# Patient Record
Sex: MALE | Race: BLACK OR AFRICAN AMERICAN | NOT HISPANIC OR LATINO | Employment: UNEMPLOYED | URBAN - METROPOLITAN AREA
[De-identification: names, ages, dates, MRNs, and addresses within clinical notes are randomized per-mention and may not be internally consistent; named-entity substitution may affect disease eponyms.]

---

## 2019-08-05 ENCOUNTER — APPOINTMENT (OUTPATIENT)
Dept: GENERAL RADIOLOGY | Facility: HOSPITAL | Age: 34
End: 2019-08-05

## 2019-08-05 ENCOUNTER — APPOINTMENT (OUTPATIENT)
Dept: CT IMAGING | Facility: HOSPITAL | Age: 34
End: 2019-08-05

## 2019-08-05 ENCOUNTER — APPOINTMENT (OUTPATIENT)
Dept: CARDIOLOGY | Facility: HOSPITAL | Age: 34
End: 2019-08-05

## 2019-08-05 ENCOUNTER — HOSPITAL ENCOUNTER (OUTPATIENT)
Facility: HOSPITAL | Age: 34
Setting detail: OBSERVATION
Discharge: HOME | End: 2019-08-06
Attending: EMERGENCY MEDICINE | Admitting: INTERNAL MEDICINE

## 2019-08-05 DIAGNOSIS — I26.90 ACUTE SEPTIC PULMONARY EMBOLISM WITHOUT ACUTE COR PULMONALE (HCC): Primary | ICD-10-CM

## 2019-08-05 PROBLEM — F19.10 POLYSUBSTANCE ABUSE (HCC): Status: ACTIVE | Noted: 2019-08-05

## 2019-08-05 PROBLEM — E87.20 METABOLIC ACIDOSIS: Status: ACTIVE | Noted: 2019-08-05

## 2019-08-05 PROBLEM — Z72.0 TOBACCO ABUSE: Status: ACTIVE | Noted: 2019-08-05

## 2019-08-05 PROBLEM — D50.9 MICROCYTIC ANEMIA: Status: ACTIVE | Noted: 2019-08-05

## 2019-08-05 PROBLEM — T50.901A DRUG OVERDOSE: Status: ACTIVE | Noted: 2019-08-05

## 2019-08-05 PROBLEM — J18.9 PNEUMONIA: Status: ACTIVE | Noted: 2019-08-05

## 2019-08-05 PROBLEM — R93.89 ABNORMAL CT OF THE CHEST: Status: ACTIVE | Noted: 2019-08-05

## 2019-08-05 LAB
ALBUMIN SERPL-MCNC: 4.6 G/DL (ref 3.5–5.2)
ALBUMIN/GLOB SERPL: 2 G/DL
ALP SERPL-CCNC: 64 U/L (ref 39–117)
ALT SERPL W P-5'-P-CCNC: 24 U/L (ref 1–41)
AMPHET+METHAMPHET UR QL: NEGATIVE
ANION GAP SERPL CALCULATED.3IONS-SCNC: 11.1 MMOL/L (ref 5–15)
ANION GAP SERPL CALCULATED.3IONS-SCNC: 19.3 MMOL/L (ref 5–15)
AST SERPL-CCNC: 32 U/L (ref 1–40)
BARBITURATES UR QL SCN: NEGATIVE
BASOPHILS # BLD AUTO: 0.05 10*3/MM3 (ref 0–0.2)
BASOPHILS NFR BLD AUTO: 0.2 % (ref 0–1.5)
BENZODIAZ UR QL SCN: NEGATIVE
BH CV ECHO MEAS - ACS: 2.7 CM
BH CV ECHO MEAS - AO MAX PG (FULL): 2.1 MMHG
BH CV ECHO MEAS - AO MAX PG: 4.9 MMHG
BH CV ECHO MEAS - AO MEAN PG (FULL): 1 MMHG
BH CV ECHO MEAS - AO MEAN PG: 3 MMHG
BH CV ECHO MEAS - AO ROOT AREA (BSA CORRECTED): 1.9
BH CV ECHO MEAS - AO ROOT AREA: 12.6 CM^2
BH CV ECHO MEAS - AO ROOT DIAM: 4 CM
BH CV ECHO MEAS - AO V2 MAX: 111 CM/SEC
BH CV ECHO MEAS - AO V2 MEAN: 80.4 CM/SEC
BH CV ECHO MEAS - AO V2 VTI: 27.4 CM
BH CV ECHO MEAS - AVA(I,A): 2.4 CM^2
BH CV ECHO MEAS - AVA(I,D): 2.4 CM^2
BH CV ECHO MEAS - AVA(V,A): 2.6 CM^2
BH CV ECHO MEAS - AVA(V,D): 2.6 CM^2
BH CV ECHO MEAS - BSA(HAYCOCK): 2 M^2
BH CV ECHO MEAS - BSA: 2.1 M^2
BH CV ECHO MEAS - BZI_BMI: 19.9 KILOGRAMS/M^2
BH CV ECHO MEAS - BZI_METRIC_HEIGHT: 195.6 CM
BH CV ECHO MEAS - BZI_METRIC_WEIGHT: 76.2 KG
BH CV ECHO MEAS - EDV(CUBED): 157.5 ML
BH CV ECHO MEAS - EDV(MOD-SP2): 112 ML
BH CV ECHO MEAS - EDV(MOD-SP4): 98 ML
BH CV ECHO MEAS - EDV(TEICH): 141.3 ML
BH CV ECHO MEAS - EF(CUBED): 59.4 %
BH CV ECHO MEAS - EF(MOD-BP): 54 %
BH CV ECHO MEAS - EF(MOD-SP2): 50.9 %
BH CV ECHO MEAS - EF(MOD-SP4): 54.1 %
BH CV ECHO MEAS - EF(TEICH): 50.5 %
BH CV ECHO MEAS - ESV(CUBED): 64 ML
BH CV ECHO MEAS - ESV(MOD-SP2): 55 ML
BH CV ECHO MEAS - ESV(MOD-SP4): 45 ML
BH CV ECHO MEAS - ESV(TEICH): 70 ML
BH CV ECHO MEAS - FS: 25.9 %
BH CV ECHO MEAS - IVS/LVPW: 1.1
BH CV ECHO MEAS - IVSD: 1.2 CM
BH CV ECHO MEAS - LAT PEAK E' VEL: 18.1 CM/SEC
BH CV ECHO MEAS - LV DIASTOLIC VOL/BSA (35-75): 47.2 ML/M^2
BH CV ECHO MEAS - LV MASS(C)D: 249.4 GRAMS
BH CV ECHO MEAS - LV MASS(C)DI: 120.1 GRAMS/M^2
BH CV ECHO MEAS - LV MAX PG: 2.8 MMHG
BH CV ECHO MEAS - LV MEAN PG: 2 MMHG
BH CV ECHO MEAS - LV SYSTOLIC VOL/BSA (12-30): 21.7 ML/M^2
BH CV ECHO MEAS - LV V1 MAX: 84.2 CM/SEC
BH CV ECHO MEAS - LV V1 MEAN: 62.9 CM/SEC
BH CV ECHO MEAS - LV V1 VTI: 19.1 CM
BH CV ECHO MEAS - LVIDD: 5.4 CM
BH CV ECHO MEAS - LVIDS: 4 CM
BH CV ECHO MEAS - LVLD AP2: 8.7 CM
BH CV ECHO MEAS - LVLD AP4: 7.1 CM
BH CV ECHO MEAS - LVLS AP2: 7.4 CM
BH CV ECHO MEAS - LVLS AP4: 6.4 CM
BH CV ECHO MEAS - LVOT AREA (M): 3.5 CM^2
BH CV ECHO MEAS - LVOT AREA: 3.5 CM^2
BH CV ECHO MEAS - LVOT DIAM: 2.1 CM
BH CV ECHO MEAS - LVPWD: 1.1 CM
BH CV ECHO MEAS - MED PEAK E' VEL: 10.9 CM/SEC
BH CV ECHO MEAS - MV A DUR: 0.15 SEC
BH CV ECHO MEAS - MV A MAX VEL: 44.1 CM/SEC
BH CV ECHO MEAS - MV DEC SLOPE: 277 CM/SEC^2
BH CV ECHO MEAS - MV DEC TIME: 0.24 SEC
BH CV ECHO MEAS - MV E MAX VEL: 56.3 CM/SEC
BH CV ECHO MEAS - MV E/A: 1.3
BH CV ECHO MEAS - MV MAX PG: 2.1 MMHG
BH CV ECHO MEAS - MV MEAN PG: 1 MMHG
BH CV ECHO MEAS - MV P1/2T MAX VEL: 71.8 CM/SEC
BH CV ECHO MEAS - MV P1/2T: 75.9 MSEC
BH CV ECHO MEAS - MV V2 MAX: 73.3 CM/SEC
BH CV ECHO MEAS - MV V2 MEAN: 37.9 CM/SEC
BH CV ECHO MEAS - MV V2 VTI: 25.5 CM
BH CV ECHO MEAS - MVA P1/2T LCG: 3.1 CM^2
BH CV ECHO MEAS - MVA(P1/2T): 2.9 CM^2
BH CV ECHO MEAS - MVA(VTI): 2.6 CM^2
BH CV ECHO MEAS - PA ACC TIME: 0.09 SEC
BH CV ECHO MEAS - PA MAX PG (FULL): 1.6 MMHG
BH CV ECHO MEAS - PA MAX PG: 2.6 MMHG
BH CV ECHO MEAS - PA PR(ACCEL): 37.6 MMHG
BH CV ECHO MEAS - PA V2 MAX: 80.1 CM/SEC
BH CV ECHO MEAS - PULM A REVS DUR: 0.14 SEC
BH CV ECHO MEAS - PULM A REVS VEL: 23.3 CM/SEC
BH CV ECHO MEAS - PULM DIAS VEL: 33.5 CM/SEC
BH CV ECHO MEAS - PULM S/D: 0.88
BH CV ECHO MEAS - PULM SYS VEL: 29.6 CM/SEC
BH CV ECHO MEAS - PVA(V,A): 1.7 CM^2
BH CV ECHO MEAS - PVA(V,D): 1.7 CM^2
BH CV ECHO MEAS - QP/QS: 0.36
BH CV ECHO MEAS - RV MAX PG: 0.92 MMHG
BH CV ECHO MEAS - RV MEAN PG: 0 MMHG
BH CV ECHO MEAS - RV V1 MAX: 48 CM/SEC
BH CV ECHO MEAS - RV V1 MEAN: 33 CM/SEC
BH CV ECHO MEAS - RV V1 VTI: 8.5 CM
BH CV ECHO MEAS - RVOT AREA: 2.8 CM^2
BH CV ECHO MEAS - RVOT DIAM: 1.9 CM
BH CV ECHO MEAS - SI(AO): 165.8 ML/M^2
BH CV ECHO MEAS - SI(CUBED): 45 ML/M^2
BH CV ECHO MEAS - SI(LVOT): 31.9 ML/M^2
BH CV ECHO MEAS - SI(MOD-SP2): 27.4 ML/M^2
BH CV ECHO MEAS - SI(MOD-SP4): 25.5 ML/M^2
BH CV ECHO MEAS - SI(TEICH): 34.3 ML/M^2
BH CV ECHO MEAS - SV(AO): 344.3 ML
BH CV ECHO MEAS - SV(CUBED): 93.5 ML
BH CV ECHO MEAS - SV(LVOT): 66.2 ML
BH CV ECHO MEAS - SV(MOD-SP2): 57 ML
BH CV ECHO MEAS - SV(MOD-SP4): 53 ML
BH CV ECHO MEAS - SV(RVOT): 24 ML
BH CV ECHO MEAS - SV(TEICH): 71.3 ML
BH CV ECHO MEAS - TAPSE (>1.6): 2 CM2
BH CV ECHO MEASUREMENTS AVERAGE E/E' RATIO: 3.88
BH CV XLRA - RV BASE: 3.8 CM
BH CV XLRA - RV LENGTH: 8.1 CM
BH CV XLRA - RV MID: 3.3 CM
BH CV XLRA - TDI S': 10.3 CM/SEC
BILIRUB SERPL-MCNC: 0.2 MG/DL (ref 0.2–1.2)
BILIRUB UR QL STRIP: NEGATIVE
BUN BLD-MCNC: 12 MG/DL (ref 6–20)
BUN BLD-MCNC: 14 MG/DL (ref 6–20)
BUN/CREAT SERPL: 12.5 (ref 7–25)
BUN/CREAT SERPL: 13.8 (ref 7–25)
CALCIUM SPEC-SCNC: 8.3 MG/DL (ref 8.6–10.5)
CALCIUM SPEC-SCNC: 9 MG/DL (ref 8.6–10.5)
CANNABINOIDS SERPL QL: POSITIVE
CHLORIDE SERPL-SCNC: 103 MMOL/L (ref 98–107)
CHLORIDE SERPL-SCNC: 107 MMOL/L (ref 98–107)
CLARITY UR: CLEAR
CO2 SERPL-SCNC: 17.7 MMOL/L (ref 22–29)
CO2 SERPL-SCNC: 22.9 MMOL/L (ref 22–29)
COCAINE UR QL: POSITIVE
COLOR UR: YELLOW
CREAT BLD-MCNC: 0.87 MG/DL (ref 0.76–1.27)
CREAT BLD-MCNC: 1.12 MG/DL (ref 0.76–1.27)
D-LACTATE SERPL-SCNC: 2 MMOL/L (ref 0.5–2)
DEPRECATED RDW RBC AUTO: 42.1 FL (ref 37–54)
DEPRECATED RDW RBC AUTO: 44 FL (ref 37–54)
EOSINOPHIL # BLD AUTO: 0.21 10*3/MM3 (ref 0–0.4)
EOSINOPHIL NFR BLD AUTO: 0.9 % (ref 0.3–6.2)
ERYTHROCYTE [DISTWIDTH] IN BLOOD BY AUTOMATED COUNT: 15.4 % (ref 12.3–15.4)
ERYTHROCYTE [DISTWIDTH] IN BLOOD BY AUTOMATED COUNT: 15.8 % (ref 12.3–15.4)
ETHANOL BLD-MCNC: <10 MG/DL (ref 0–10)
ETHANOL UR QL: <0.01 %
GFR SERPL CREATININE-BSD FRML MDRD: 121 ML/MIN/1.73
GFR SERPL CREATININE-BSD FRML MDRD: 90 ML/MIN/1.73
GLOBULIN UR ELPH-MCNC: 2.3 GM/DL
GLUCOSE BLD-MCNC: 130 MG/DL (ref 65–99)
GLUCOSE BLD-MCNC: 86 MG/DL (ref 65–99)
GLUCOSE UR STRIP-MCNC: NEGATIVE MG/DL
HCT VFR BLD AUTO: 34.6 % (ref 37.5–51)
HCT VFR BLD AUTO: 41.5 % (ref 37.5–51)
HGB BLD-MCNC: 10.8 G/DL (ref 13–17.7)
HGB BLD-MCNC: 12.5 G/DL (ref 13–17.7)
HGB UR QL STRIP.AUTO: NEGATIVE
IMM GRANULOCYTES # BLD AUTO: 0.14 10*3/MM3 (ref 0–0.05)
IMM GRANULOCYTES NFR BLD AUTO: 0.6 % (ref 0–0.5)
IRON 24H UR-MRATE: 65 MCG/DL (ref 59–158)
IRON SATN MFR SERPL: 26 % (ref 20–50)
KETONES UR QL STRIP: ABNORMAL
LEFT ATRIUM VOLUME INDEX: 24 ML/M2
LEUKOCYTE ESTERASE UR QL STRIP.AUTO: NEGATIVE
LYMPHOCYTES # BLD AUTO: 2.16 10*3/MM3 (ref 0.7–3.1)
LYMPHOCYTES NFR BLD AUTO: 9.1 % (ref 19.6–45.3)
MAXIMAL PREDICTED HEART RATE: 185 BPM
MCH RBC QN AUTO: 23.4 PG (ref 26.6–33)
MCH RBC QN AUTO: 23.5 PG (ref 26.6–33)
MCHC RBC AUTO-ENTMCNC: 30.1 G/DL (ref 31.5–35.7)
MCHC RBC AUTO-ENTMCNC: 31.2 G/DL (ref 31.5–35.7)
MCV RBC AUTO: 75.4 FL (ref 79–97)
MCV RBC AUTO: 77.7 FL (ref 79–97)
METHADONE UR QL SCN: NEGATIVE
MONOCYTES # BLD AUTO: 1.42 10*3/MM3 (ref 0.1–0.9)
MONOCYTES NFR BLD AUTO: 6 % (ref 5–12)
NEUTROPHILS # BLD AUTO: 19.81 10*3/MM3 (ref 1.7–7)
NEUTROPHILS NFR BLD AUTO: 83.2 % (ref 42.7–76)
NITRITE UR QL STRIP: NEGATIVE
NRBC BLD AUTO-RTO: 0 /100 WBC (ref 0–0.2)
OPIATES UR QL: NEGATIVE
OXYCODONE UR QL SCN: NEGATIVE
PH UR STRIP.AUTO: <=5 [PH] (ref 5–8)
PLATELET # BLD AUTO: 164 10*3/MM3 (ref 140–450)
PLATELET # BLD AUTO: 181 10*3/MM3 (ref 140–450)
PMV BLD AUTO: 12 FL (ref 6–12)
PMV BLD AUTO: 9.8 FL (ref 6–12)
POTASSIUM BLD-SCNC: 4.4 MMOL/L (ref 3.5–5.2)
POTASSIUM BLD-SCNC: 4.5 MMOL/L (ref 3.5–5.2)
PROCALCITONIN SERPL-MCNC: 1.39 NG/ML (ref 0.1–0.25)
PROT SERPL-MCNC: 6.9 G/DL (ref 6–8.5)
PROT UR QL STRIP: ABNORMAL
RBC # BLD AUTO: 4.59 10*6/MM3 (ref 4.14–5.8)
RBC # BLD AUTO: 5.34 10*6/MM3 (ref 4.14–5.8)
SODIUM BLD-SCNC: 137 MMOL/L (ref 136–145)
SODIUM BLD-SCNC: 144 MMOL/L (ref 136–145)
SP GR UR STRIP: >=1.03 (ref 1–1.03)
STRESS TARGET HR: 157 BPM
TIBC SERPL-MCNC: 246 MCG/DL (ref 298–536)
TRANSFERRIN SERPL-MCNC: 165 MG/DL (ref 200–360)
UROBILINOGEN UR QL STRIP: ABNORMAL
WBC NRBC COR # BLD: 12.09 10*3/MM3 (ref 3.4–10.8)
WBC NRBC COR # BLD: 23.79 10*3/MM3 (ref 3.4–10.8)

## 2019-08-05 PROCEDURE — 80307 DRUG TEST PRSMV CHEM ANLYZR: CPT | Performed by: EMERGENCY MEDICINE

## 2019-08-05 PROCEDURE — 84145 PROCALCITONIN (PCT): CPT | Performed by: NURSE PRACTITIONER

## 2019-08-05 PROCEDURE — 85025 COMPLETE CBC W/AUTO DIFF WBC: CPT | Performed by: EMERGENCY MEDICINE

## 2019-08-05 PROCEDURE — 83540 ASSAY OF IRON: CPT | Performed by: NURSE PRACTITIONER

## 2019-08-05 PROCEDURE — 80053 COMPREHEN METABOLIC PANEL: CPT | Performed by: EMERGENCY MEDICINE

## 2019-08-05 PROCEDURE — 85027 COMPLETE CBC AUTOMATED: CPT | Performed by: NURSE PRACTITIONER

## 2019-08-05 PROCEDURE — 25010000003 CEFTRIAXONE PER 250 MG: Performed by: EMERGENCY MEDICINE

## 2019-08-05 PROCEDURE — G0378 HOSPITAL OBSERVATION PER HR: HCPCS

## 2019-08-05 PROCEDURE — 99285 EMERGENCY DEPT VISIT HI MDM: CPT

## 2019-08-05 PROCEDURE — 36415 COLL VENOUS BLD VENIPUNCTURE: CPT | Performed by: NURSE PRACTITIONER

## 2019-08-05 PROCEDURE — 71275 CT ANGIOGRAPHY CHEST: CPT

## 2019-08-05 PROCEDURE — 81003 URINALYSIS AUTO W/O SCOPE: CPT | Performed by: EMERGENCY MEDICINE

## 2019-08-05 PROCEDURE — 96361 HYDRATE IV INFUSION ADD-ON: CPT

## 2019-08-05 PROCEDURE — 80048 BASIC METABOLIC PNL TOTAL CA: CPT | Performed by: NURSE PRACTITIONER

## 2019-08-05 PROCEDURE — 0 IOPAMIDOL PER 1 ML: Performed by: EMERGENCY MEDICINE

## 2019-08-05 PROCEDURE — 84466 ASSAY OF TRANSFERRIN: CPT | Performed by: NURSE PRACTITIONER

## 2019-08-05 PROCEDURE — 71046 X-RAY EXAM CHEST 2 VIEWS: CPT

## 2019-08-05 PROCEDURE — 93306 TTE W/DOPPLER COMPLETE: CPT | Performed by: INTERNAL MEDICINE

## 2019-08-05 PROCEDURE — 87040 BLOOD CULTURE FOR BACTERIA: CPT | Performed by: EMERGENCY MEDICINE

## 2019-08-05 PROCEDURE — 25010000002 VANCOMYCIN PER 500 MG: Performed by: NURSE PRACTITIONER

## 2019-08-05 PROCEDURE — 96365 THER/PROPH/DIAG IV INF INIT: CPT

## 2019-08-05 PROCEDURE — 83605 ASSAY OF LACTIC ACID: CPT | Performed by: EMERGENCY MEDICINE

## 2019-08-05 PROCEDURE — 99254 IP/OBS CNSLTJ NEW/EST MOD 60: CPT | Performed by: INTERNAL MEDICINE

## 2019-08-05 PROCEDURE — 25010000002 VANCOMYCIN 10 G RECONSTITUTED SOLUTION: Performed by: EMERGENCY MEDICINE

## 2019-08-05 PROCEDURE — 93306 TTE W/DOPPLER COMPLETE: CPT

## 2019-08-05 RX ORDER — ONDANSETRON 2 MG/ML
4 INJECTION INTRAMUSCULAR; INTRAVENOUS EVERY 6 HOURS PRN
Status: DISCONTINUED | OUTPATIENT
Start: 2019-08-05 | End: 2019-08-06 | Stop reason: HOSPADM

## 2019-08-05 RX ORDER — SODIUM CHLORIDE 9 MG/ML
1000 INJECTION, SOLUTION INTRAVENOUS CONTINUOUS
Status: DISCONTINUED | OUTPATIENT
Start: 2019-08-05 | End: 2019-08-05

## 2019-08-05 RX ORDER — CALCIUM CARBONATE 200(500)MG
2 TABLET,CHEWABLE ORAL 2 TIMES DAILY PRN
Status: DISCONTINUED | OUTPATIENT
Start: 2019-08-05 | End: 2019-08-06 | Stop reason: HOSPADM

## 2019-08-05 RX ORDER — ONDANSETRON 4 MG/1
4 TABLET, FILM COATED ORAL EVERY 6 HOURS PRN
Status: DISCONTINUED | OUTPATIENT
Start: 2019-08-05 | End: 2019-08-06 | Stop reason: HOSPADM

## 2019-08-05 RX ORDER — SODIUM CHLORIDE 0.9 % (FLUSH) 0.9 %
3-10 SYRINGE (ML) INJECTION AS NEEDED
Status: DISCONTINUED | OUTPATIENT
Start: 2019-08-05 | End: 2019-08-06 | Stop reason: HOSPADM

## 2019-08-05 RX ORDER — SODIUM CHLORIDE 0.9 % (FLUSH) 0.9 %
10 SYRINGE (ML) INJECTION AS NEEDED
Status: DISCONTINUED | OUTPATIENT
Start: 2019-08-05 | End: 2019-08-06 | Stop reason: HOSPADM

## 2019-08-05 RX ORDER — SODIUM CHLORIDE 0.9 % (FLUSH) 0.9 %
3 SYRINGE (ML) INJECTION EVERY 12 HOURS SCHEDULED
Status: DISCONTINUED | OUTPATIENT
Start: 2019-08-05 | End: 2019-08-06 | Stop reason: HOSPADM

## 2019-08-05 RX ORDER — CEFTRIAXONE SODIUM 2 G/50ML
2 INJECTION, SOLUTION INTRAVENOUS ONCE
Status: COMPLETED | OUTPATIENT
Start: 2019-08-05 | End: 2019-08-05

## 2019-08-05 RX ORDER — CEFTRIAXONE SODIUM 2 G/50ML
2 INJECTION, SOLUTION INTRAVENOUS EVERY 24 HOURS
Status: DISCONTINUED | OUTPATIENT
Start: 2019-08-06 | End: 2019-08-06 | Stop reason: HOSPADM

## 2019-08-05 RX ORDER — NITROGLYCERIN 0.4 MG/1
0.4 TABLET SUBLINGUAL
Status: DISCONTINUED | OUTPATIENT
Start: 2019-08-05 | End: 2019-08-06 | Stop reason: HOSPADM

## 2019-08-05 RX ORDER — BISACODYL 5 MG/1
5 TABLET, DELAYED RELEASE ORAL DAILY PRN
Status: DISCONTINUED | OUTPATIENT
Start: 2019-08-05 | End: 2019-08-06 | Stop reason: HOSPADM

## 2019-08-05 RX ORDER — ACETAMINOPHEN 325 MG/1
650 TABLET ORAL EVERY 4 HOURS PRN
Status: DISCONTINUED | OUTPATIENT
Start: 2019-08-05 | End: 2019-08-06 | Stop reason: HOSPADM

## 2019-08-05 RX ORDER — VANCOMYCIN HYDROCHLORIDE 1 G/200ML
1000 INJECTION, SOLUTION INTRAVENOUS EVERY 8 HOURS
Status: DISCONTINUED | OUTPATIENT
Start: 2019-08-05 | End: 2019-08-06 | Stop reason: HOSPADM

## 2019-08-05 RX ORDER — SODIUM CHLORIDE 9 MG/ML
100 INJECTION, SOLUTION INTRAVENOUS CONTINUOUS
Status: DISCONTINUED | OUTPATIENT
Start: 2019-08-05 | End: 2019-08-06 | Stop reason: HOSPADM

## 2019-08-05 RX ADMIN — VANCOMYCIN HYDROCHLORIDE 1000 MG: 1 INJECTION, SOLUTION INTRAVENOUS at 12:47

## 2019-08-05 RX ADMIN — SODIUM CHLORIDE 100 ML/HR: 9 INJECTION, SOLUTION INTRAVENOUS at 08:42

## 2019-08-05 RX ADMIN — VANCOMYCIN HYDROCHLORIDE 1750 MG: 10 INJECTION, POWDER, LYOPHILIZED, FOR SOLUTION INTRAVENOUS at 05:18

## 2019-08-05 RX ADMIN — SODIUM CHLORIDE 100 ML/HR: 9 INJECTION, SOLUTION INTRAVENOUS at 18:06

## 2019-08-05 RX ADMIN — SODIUM CHLORIDE, PRESERVATIVE FREE 3 ML: 5 INJECTION INTRAVENOUS at 20:38

## 2019-08-05 RX ADMIN — CEFTRIAXONE SODIUM 2 G: 2 INJECTION, SOLUTION INTRAVENOUS at 04:26

## 2019-08-05 RX ADMIN — VANCOMYCIN HYDROCHLORIDE 1000 MG: 1 INJECTION, SOLUTION INTRAVENOUS at 20:38

## 2019-08-05 RX ADMIN — SODIUM CHLORIDE 125 ML/HR: 9 INJECTION, SOLUTION INTRAVENOUS at 02:57

## 2019-08-05 RX ADMIN — IOPAMIDOL 95 ML: 755 INJECTION, SOLUTION INTRAVENOUS at 03:13

## 2019-08-05 NOTE — ED TRIAGE NOTES
Pt was found down at his friends house. Pt reports smoking marijuana. EMS reports pt had an O2 sat of 54 and had snoring respirations. Pt was given MG of Narcan. Pt is now alert and oriented. Pts Temp is 94.6

## 2019-08-05 NOTE — PLAN OF CARE
Problem: Patient Care Overview  Goal: Plan of Care Review  Outcome: Ongoing (interventions implemented as appropriate)  Pt states he does not remember what happened, just drinking water before going to bed. Wife at bedside. He denies pain, discomfort, or shortness of breath at this time

## 2019-08-05 NOTE — CONSULTS
Referring Provider: Cameron Nayak MD  [address] [facility] [address] [address] [address]  [place] [address]  Reason for Consultation: Concern for pulmonary septic emboli    Subjective   History of present illness: This is a 34-year-old male with history of polysubstance abuse who was admitted August 5 after being found down at his friend's house.  Upon EMS arrival he was satting 54% on room air.  He was given Narcan.  Patient denies any fever chills or night sweats.  Admission blood work revealed a white blood cell count 23,000 and elevated procalcitonin above 1.  Imaging showed upper lobe nodular lesions concerning for septic emboli.  The patient has been afebrile.  Blood cultures are negative today.  He has been empirically started on vancomycin and cefepime.   Patient states he has been feeling totally fine.  He denies any fevers chills or night sweats.  No shortness of breath cough or chest pain.  He states he is weight has been stable.  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  He adamantly denies IV drug use and cannot explain why his UDS is positive for cocaine.  He states he was tested for HIV about a year ago and was negative.  He does not recall ever being tested for hepatitis C.    He denies any TB risk factors specifically no known contact with a TB patient.  No time spent at a homeless shelter [facility]way or healthcare setting    Past Medical History:   Diagnosis Date   • Pneumothorax, spontaneous, tension     right - pt was seen at [facility] for this in either 2014 or 2015.       Past Surgical History:   Procedure Laterality Date   • CHEST TUBE INSERTION Right         reports that he has been smoking cigars.  He does not have any smokeless tobacco history on file. He reports that he does not drink alcohol. His drug history is not on file.    family history is not on file.    No Known Allergies    Medication:  Antibiotics:  Ceftriaxone 2 g IV every 24 hours  Vancomycin 1000 mg IV every 8 hours    Please refer to the medical record for a  full medication list    Review of Systems  Pertinent items are noted in HPI, all other systems reviewed and negative    Objective   Vital Signs   Temp:  [94.6 °F (34.8 °C)-98.7 °F (37.1 °C)] 97.6 °F (36.4 °C)  Heart Rate:  [] 50  Resp:  [16-18] 16  BP: (100-125)/(59-81) 100/59    Physical Exam:   General: In no acute distress  HEENT: Normocephalic, atraumatic, PERRL, EOMI, no scleral icterus. Oropharynx is clear and moist  Neck: Supple, trachea is midline  Cardiovascular: Normal rate, regular rhythm, fly S1 and S2, faint murmur question   Respiratory: Crackles at the upper lung fields bilaterally right greater than left no wheezing   GI: Abdomen is soft, non-tender, non-distended, positive bowel sounds bilaterally, no masses  Musculoskeletal: Normal range of motion, no edema, tenderness or deformity  Skin: No rashes or lesions  Extremities: no E/C/C  Neurological: Alert and oriented, moving all 4 extremities  Psychiatric: Normal mood and affect     Results Review:   I reviewed the patient's new clinical results.  I reviewed the patient's new imaging results and agree with the interpretation.    Lab Results   Component Value Date    WBC 12.09 (H) 08/05/2019    HGB 10.8 (L) 08/05/2019    HCT 34.6 (L) 08/05/2019    MCV 75.4 (L) 08/05/2019     08/05/2019       Lab Results   Component Value Date    GLUCOSE 86 08/05/2019    BUN 12 08/05/2019    CREATININE 0.87 08/05/2019    EGFRIFAFRI 121 08/05/2019    BCR 13.8 08/05/2019    CO2 22.9 08/05/2019    CALCIUM 8.3 (L) 08/05/2019    ALBUMIN 4.60 08/05/2019    AST 32 08/05/2019    ALT 24 08/05/2019     Procalcitonin 1.39  Urinalysis trace protein trace ketones  He is positive for cocaine and THC screen    Microbiology:  8/5 BCx NGTD x 2    Radiology:  Admission chest x-ray personally reviewed by me shows bilateral nodular lesions.  No pleural effusions.    8/5 CT angiogram of the chest personally reviewed by me shows a nodule in the right upper lobe.,  2 nodules  in the left upper lobe.  Possible cavitation.  Possible septic emboli.    Assessment/Plan   1.  Bilateral pulmonary nodules with cavitation  -Agree with concern for possible septic emboli  -f/u TTE  - Continue empiric vancomycin and ceftriaxone for now  -Follow blood cultures  -Elevated procalcitonin concerning for bacteremia    2.  Polysubstance abuse complicating above  - Check HIV and an acute hepatitis panel    3.  Leukocytosis    I discussed the patients findings and my recommendations with patient and nursing staff

## 2019-08-05 NOTE — ED NOTES
Nursing report ED to floor  Ozzy Madrigal  34 y.o.  male    HPI (triage note):   Chief Complaint   Patient presents with   • Drug Overdose       Admitting doctor:   Cameron Nayak MD    Admitting diagnosis:   The encounter diagnosis was Acute septic pulmonary embolism without acute cor pulmonale (CMS/HCC).    Code status:   Current Code Status     Date Active Code Status Order ID Comments User Context       8/5/2019 04:57 CPR [id number]  Tawanna Hampton APRN ED       Questions for Current Code Status     Question Answer Comment    Code Status CPR     Medical Interventions (Level of Support Prior to Arrest) Full           Allergies:   Patient has no known allergies.    Weight:       08/05/19  0136   Weight: 83.9 kg (185 lb)       Most recent vitals:   Vitals:    08/05/19 0356 08/05/19 0407 08/05/19 0419 08/05/19 0438   BP: 107/75 101/68  106/79   BP Location: Right arm Right arm     Patient Position: Lying Lying     Pulse: 74 64  63   Resp:       Temp:   97.7 °F (36.5 °C)    TempSrc:   Oral    SpO2: 100% 100%  100%   Weight:       Height:           Active LDAs/IV Access:   Lines, Drains & Airways    Active LDAs     Name:   Placement date:   Placement time:   Site:   Days:    Peripheral IV 08/05/19 0123 Left Antecubital   08/05/19 0123    Antecubital   less than 1                Labs (abnormal labs have a star):   Labs Reviewed   COMPREHENSIVE METABOLIC PANEL - Abnormal; Notable for the following components:       Result Value    Glucose 130 (*)     CO2 17.7 (*)     Anion Gap 19.3 (*)     All other components within normal limits    Narrative:     GFR Normal >60  Chronic Kidney Disease <60  Kidney Failure <15   URINALYSIS W/ MICROSCOPIC IF INDICATED (NO CULTURE) - Abnormal; Notable for the following components:    Ketones, UA Trace (*)     Protein, UA Trace (*)     All other components within normal limits    Narrative:     Urine microscopic not indicated.   URINE DRUG SCREEN - Abnormal; Notable for the  following components:    Cocaine Screen, Urine Positive (*)     THC, Screen, Urine Positive (*)     All other components within normal limits    Narrative:     Negative Thresholds For Drugs Screened:     Amphetamines               500 ng/ml   Barbiturates               200 ng/ml   Benzodiazepines            100 ng/ml   Cocaine                    300 ng/ml   Methadone                  300 ng/ml   Opiates                    300 ng/ml   Oxycodone                  100 ng/ml   THC                        50 ng/ml    The Normal Value for all drugs tested is negative. This report includes final unconfirmed screening results to be used for medical treatment purposes only. Unconfirmed results must not be used for non-medical purposes such as employment or legal testing. Clinical consideration should be applied to any drug of abuse test, particulary when unconfirmed results are used.   CBC WITH AUTO DIFFERENTIAL - Abnormal; Notable for the following components:    WBC 23.79 (*)     Hemoglobin 12.5 (*)     MCV 77.7 (*)     MCH 23.4 (*)     MCHC 30.1 (*)     RDW 15.8 (*)     Neutrophil % 83.2 (*)     Lymphocyte % 9.1 (*)     Immature Grans % 0.6 (*)     Neutrophils, Absolute 19.81 (*)     Monocytes, Absolute 1.42 (*)     Immature Grans, Absolute 0.14 (*)     All other components within normal limits   LACTIC ACID, PLASMA - Normal   BLOOD CULTURE   BLOOD CULTURE   ETHANOL   CBC (NO DIFF)   BASIC METABOLIC PANEL   CBC AND DIFFERENTIAL    Narrative:     The following orders were created for panel order CBC & Differential.  Procedure                               Abnormality         Status                     ---------                               -----------         ------                     CBC Auto Differential[[id number]]        Abnormal            Final result                 Please view results for these tests on the individual orders.       EKG:   No orders to display       Meds given in ED:   Medications   sodium chloride  0.9 % flush 10 mL (not administered)   sodium chloride 0.9 % infusion (0 mL/hr Intravenous Stopped 8/5/19 0350)   vancomycin 1750 mg/500 mL 0.9% NS IVPB ([facility]) (not administered)   sodium chloride 0.9 % flush 3 mL (not administered)   sodium chloride 0.9 % flush 3-10 mL (not administered)   nitroglycerin (NITROSTAT) SL tablet 0.4 mg (not administered)   sodium chloride 0.9 % infusion (not administered)   acetaminophen (TYLENOL) tablet 650 mg (not administered)   calcium carbonate (TUMS) chewable tablet 500 mg (200 mg elemental) (not administered)   bisacodyl (DULCOLAX) EC tablet 5 mg (not administered)   ondansetron (ZOFRAN) tablet 4 mg (not administered)     Or   ondansetron (ZOFRAN) injection 4 mg (not administered)   Pharmacy to dose vancomycin (not administered)   Pharmacy Consult - Pharmacy to dose (not administered)   iopamidol (ISOVUE-370) 76 % injection 100 mL (95 mL Intravenous Given by Other 8/5/19 2548)   cefTRIAXone (ROCEPHIN) IVPB 2 g (0 g Intravenous Stopped 8/5/19 7792)       Imaging results:  Xr Chest 2 View    Result Date: 8/5/2019  The patient has a few areas of nodularity seen within both lungs. Appearance is nonspecific, but septic emboli should be considered.  This report was finalized on 8/5/2019 2:53 AM by Dr. Meggan Blanchard M.D.      Ct Angiogram Chest With Contrast    Result Date: 8/5/2019   1. There is a bilobed nodule within the right upper lobe which is indeterminate. It does have some vascular supply, although I'm unable to clarify that this is a pulmonary AVM. However, the so-called feeding vessel sign can also be associated with septic emboli. There are 2 additional nodules with feeding vessels identified. These are located within the left upper lobe. They are also indeterminate but they do appear to show some cavitation, which can also be associated with septic emboli. Correlation with any history of IV drug abuse is recommended. Echo could also be considered to evaluate for  valvular vegetations. Patient does have mild background emphysematous changes, and short-term CT follow-up to document complete resolution is recommended.  Radiation dose reduction techniques were utilized, including automated exposure control and exposure modulation based on body size.  This report was finalized on 8/5/2019 3:50 AM by Dr. Meggan Blanchard M.D.        Ambulatory status:   - Up with assist.    Social issues:   Social History     Socioeconomic History   • Marital status: Single     Spouse name: Not on file   • Number of children: Not on file   • Years of education: Not on file   • Highest education level: Not on file   Tobacco Use   • Smoking status: Current Every Day Smoker     Types: Cigars   • Tobacco comment: 1 black mild a day.   Substance and Sexual Activity   • Alcohol use: No     Frequency: Never        Verna Fuentes RN  08/05/19 8838

## 2019-08-05 NOTE — CONSULTS
"Pt is a 34 year old, single,  male seen today in room 522. He was found unresponsive by his girlfriend on the couch, and transported to ED via EMS. He lives with his girlfriend, has no children, and works at a restaurant.     He reports that all he can remember is going home and sitting down on the couch, then later waking up in the ambulance. Clinician had to gently probe to get pt to admit that he had smoked marijuana just before this event. (At first he reported no drugs at all).     He denies any previous psychiatric history. He has never seen a psychiatrist or therapist. He has never been inpatient anywhere and he has never been prescribed any psychotropic medications. He denies SI/HI. He denies a wish to be dead. He denies any previous suicide attempts or self harming behaviors.     During entire interview, pt is apathetic and oblivious. He has poor eye contact, and talks very softly and mumbles at times. It seems to this clinician that pt is concealing his drug use. When asked about positive cocaine result on tox screen, he adamantly denies ever using cocaine.   Explained to pt the fact that he was found unresponsive, with low O2 sats, and had a positive response to Narcan was indicative of an opiate overdose. (However his tox screen is negative for opiates at this time.) Pt again reports that he does not use any form of opiates. He also denies use of tobacco and alcohol.     Asked if pt thought there was any chance that the marijuana he used was laced. \"I guess it's a possibility but I don't think the dude I'm messing with would do that.\" Asked if this was someone pt regularly purchased his marijuana from, and he stated no, that he hardly knew much about this ling. Pt also reports that this was the first time smoking any marijuana out of this batch, and that he will not smoke anymore of it just in case it has actually been tampered with.     Reiterated role of [facility] clinician to help " "provide resources to help pt get off drugs. Pt states, \"no I don't need that. If I am going to quit smoking, I'll do that on my own.\"     Discussed with TRE Chaudhry. Pt is minimizing and concealing his drug use. Would have to be willing/voluntary to get into any drug treatment program and it appears at this time pt is not ready to admit to his actual usage in order to seek treatment resources. AC will sign off. If needed for any future reasons, please re-consult.  "

## 2019-08-05 NOTE — PROGRESS NOTES
"Pharmacokinetic Consult - Vancomycin Dosing (Initial Note)    Ozzy Kaitlin has been consulted for pharmacy to dose vancomycin for sepsis/possible septic emboli.  Pharmacy dosing vancomycin per Tawanna Hampton' request.   Goal trough: 15-20 mg/L   Other antimicrobials: Ceftriaxone 2gm q24h    Relevant clinical data and objective history reviewed:  34 y.o. male 195.6 cm (77\") 83.9 kg (185 lb)    Past Medical History:   Diagnosis Date   • Pneumothorax, spontaneous, tension     right - pt was seen at [facility] for this in either 2014 or 2015.     Creatinine   Date Value Ref Range Status   08/05/2019 1.12 0.76 - 1.27 mg/dL Final     BUN   Date Value Ref Range Status   08/05/2019 14 6 - 20 mg/dL Final     Estimated Creatinine Clearance: 109.2 mL/min (by C-G formula based on SCr of 1.12 mg/dL).    Lab Results   Component Value Date    WBC 23.79 (H) 08/05/2019     Temp Readings from Last 3 Encounters:   08/05/19 97.7 °F (36.5 °C) (Oral)      Baseline culture/source/susceptibility:     8/5   Blood cx pending 2/2    Imaging  8/5 - CXR  IMPRESSION:  The patient has a few areas of nodularity seen within both lungs.  Appearance is nonspecific, but septic emboli should be considered.     8/5 - CT Angiogram Chest  IMPRESSION:     1. There is a bilobed nodule within the right upper lobe which is  indeterminate. It does have some vascular supply, although I'm unable to  clarify that this is a pulmonary AVM. However, the so-called feeding  vessel sign can also be associated with septic emboli. There are 2  additional nodules with feeding vessels identified. These are located  within the left upper lobe. They are also indeterminate but they do  appear to show some cavitation, which can also be associated with septic  emboli. Correlation with any history of IV drug abuse is recommended.  Echo could also be considered to evaluate for valvular vegetations.  Patient does have mild background emphysematous changes, and short-term  CT " follow-up to document complete resolution is recommended.    Assessment/Plan    Vancomycin 1750mg (21mg/kg) given in the ED.    1) Will start vancomycin 1000mg IV q8h    2) Trough scheduled for 8/6 at 1200 prior to 5th total dose    3) Will monitor serum creatinine every 24 hours for the first 3 days then at least every 48 hours per dosing recommendations. Pharmacy will continue to follow daily while on vancomycin and adjust as needed.     Luis Alberto Minaya [facility]

## 2019-08-05 NOTE — H&P
Patient Name:  Ozzy Madrigal  YOB: 1985  MRN:  [id number]  Admit Date:  8/5/2019  Patient Care Team:  Provider, No Known as PCP - General      Subjective   History Present Illness     Chief Complaint   Patient presents with   • Drug Overdose       Mr. Madrigal is a 34 y.o. smoker with a history of polysubstance abuse that presents to [facility] by EMS. He was found unresponsive by his girlfriend around midnight last night.  Patient states he remembers smoking marijuana but denies any other illicit drug use.  According to ED physicians notes, upon arrival EMS states patient's oxygen saturation was 54% requiring ventilation assistance and Narcan with improved awareness after administration.  Drug screen in the ED was positive for THC and cocaine.   He denies recent chest pain, shortness of breath, palpitations, lightheadedness, dizziness, fever, chills, or lower extremity swelling.  He denies history of IV drug use. He has been in his normal state of health.  He quit smoking cigarettes several months ago but continues to smoke cigars. He denies regular ETOH use.  She denies any other known chronic health issues.  He does have a history of a spontaneous pneumothorax approximately 5 to 6 years ago.        History of Present Illness  Review of Systems   Constitutional: Negative for activity change, appetite change, chills, diaphoresis, fatigue, fever and unexpected weight change.   HENT: Negative for congestion and trouble swallowing.    Eyes: Negative for visual disturbance.   Respiratory: Negative for cough, choking, chest tightness, shortness of breath, wheezing and stridor.    Cardiovascular: Negative for chest pain and palpitations.   Gastrointestinal: Negative for abdominal distention, abdominal pain, blood in stool, constipation, diarrhea, nausea and vomiting.   Endocrine: Negative for polydipsia, polyphagia and polyuria.   Musculoskeletal: Negative for back pain.   Skin:  Negative for pallor.   Allergic/Immunologic: Negative for immunocompromised state.   Neurological: Negative for dizziness, syncope, weakness and light-headedness.   Hematological: Does not bruise/bleed easily.   Psychiatric/Behavioral: Negative for confusion and self-injury.        Personal History     Past Medical History:   Diagnosis Date   • Pneumothorax, spontaneous, tension     right - pt was seen at [facility] for this in either 2014 or 2015.     Past Surgical History:   Procedure Laterality Date   • CHEST TUBE INSERTION Right      History reviewed. No pertinent family history.  Social History     Tobacco Use   • Smoking status: Current Every Day Smoker     Types: Cigars   • Tobacco comment: 1 black mild a day.   Substance Use Topics   • Alcohol use: No     Frequency: Never   • Drug use: Not on file     No medications prior to admission.     Allergies:  No Known Allergies    Objective    Objective     Vital Signs  Temp:  [94.6 °F (34.8 °C)-98.7 °F (37.1 °C)] 97.6 °F (36.4 °C)  Heart Rate:  [] 50  Resp:  [16-18] 16  BP: (100-125)/(59-81) 100/59  SpO2:  [94 %-100 %] 100 %  on   ;   Device (Oxygen Therapy): room air  Body mass index is 19.92 kg/m².    Physical Exam   Constitutional: He is oriented to person, place, and time. He appears well-developed and well-nourished. No distress.   Non toxic appearing thin male   HENT:   Head: Normocephalic and atraumatic.   Mouth/Throat: Oropharynx is clear and moist.   Eyes: Conjunctivae and EOM are normal.   Neck: Normal range of motion. No JVD present. No tracheal deviation present.   Cardiovascular: Normal rate, regular rhythm, normal heart sounds and intact distal pulses.   No murmur heard.  Pulmonary/Chest: Effort normal and breath sounds normal. No stridor. No respiratory distress. He has no wheezes. He has no rales. He exhibits no tenderness.   Abdominal: Soft. Bowel sounds are normal. He exhibits no distension and no mass. There is no tenderness. There is no  rebound and no guarding.   Musculoskeletal: He exhibits no edema.   Neurological: He is alert and oriented to person, place, and time. No cranial nerve deficit.   Skin: Skin is warm and dry. Capillary refill takes less than 2 seconds.   Psychiatric: He has a normal mood and affect. His behavior is normal.   Nursing note and vitals reviewed.      Results Review:  I reviewed the patient's new clinical results.  I reviewed the patient's new imaging results and agree with the interpretation.  I reviewed the patient's other test results and agree with the interpretation  I personally viewed and interpreted the patient's EKG/Telemetry data  Discussed with ED provider.    Lab Results (last 24 hours)     Procedure Component Value Units Date/Time    CBC & Differential [[id number]] Collected:  08/05/19 0141    Specimen:  Blood Updated:  08/05/19 0153    Narrative:       The following orders were created for panel order CBC & Differential.  Procedure                               Abnormality         Status                     ---------                               -----------         ------                     CBC Auto Differential[[id number]]        Abnormal            Final result                 Please view results for these tests on the individual orders.    Comprehensive Metabolic Panel [[id number]]  (Abnormal) Collected:  08/05/19 0141    Specimen:  Blood Updated:  08/05/19 0229     Glucose 130 mg/dL      BUN 14 mg/dL      Creatinine 1.12 mg/dL      Sodium 144 mmol/L      Potassium 4.5 mmol/L      Chloride 107 mmol/L      CO2 17.7 mmol/L      Calcium 9.0 mg/dL      Total Protein 6.9 g/dL      Albumin 4.60 g/dL      ALT (SGPT) 24 U/L      AST (SGOT) 32 U/L      Alkaline Phosphatase 64 U/L      Total Bilirubin 0.2 mg/dL      eGFR  African Amer 90 mL/min/1.73      Globulin 2.3 gm/dL      A/G Ratio 2.0 g/dL      BUN/Creatinine Ratio 12.5     Anion Gap 19.3 mmol/L     Narrative:       GFR Normal >60  Chronic Kidney Disease  <60  Kidney Failure <15    Ethanol [[id number]] Collected:  08/05/19 0141    Specimen:  Blood Updated:  08/05/19 0229     Ethanol <10 mg/dL      Ethanol % <0.010 %     CBC Auto Differential [[id number]]  (Abnormal) Collected:  08/05/19 0141    Specimen:  Blood Updated:  08/05/19 0153     WBC 23.79 10*3/mm3      RBC 5.34 10*6/mm3      Hemoglobin 12.5 g/dL      Hematocrit 41.5 %      MCV 77.7 fL      MCH 23.4 pg      MCHC 30.1 g/dL      RDW 15.8 %      RDW-SD 44.0 fl      MPV 9.8 fL      Platelets 181 10*3/mm3      Neutrophil % 83.2 %      Lymphocyte % 9.1 %      Monocyte % 6.0 %      Eosinophil % 0.9 %      Basophil % 0.2 %      Immature Grans % 0.6 %      Neutrophils, Absolute 19.81 10*3/mm3      Lymphocytes, Absolute 2.16 10*3/mm3      Monocytes, Absolute 1.42 10*3/mm3      Eosinophils, Absolute 0.21 10*3/mm3      Basophils, Absolute 0.05 10*3/mm3      Immature Grans, Absolute 0.14 10*3/mm3      nRBC 0.0 /100 WBC     Urinalysis With Microscopic If Indicated (No Culture) - Urine, Clean Catch [[id number]]  (Abnormal) Collected:  08/05/19 0254    Specimen:  Urine, Clean Catch Updated:  08/05/19 0303     Color, UA Yellow     Appearance, UA Clear     pH, UA <=5.0     Specific Gravity, UA >=1.030     Glucose, UA Negative     Ketones, UA Trace     Bilirubin, UA Negative     Blood, UA Negative     Protein, UA Trace     Leuk Esterase, UA Negative     Nitrite, UA Negative     Urobilinogen, UA 1.0 E.U./dL    Narrative:       Urine microscopic not indicated.    Urine Drug Screen - Urine, Clean Catch [[id number]]  (Abnormal) Collected:  08/05/19 0254    Specimen:  Urine, Clean Catch Updated:  08/05/19 0325     Amphet/Methamphet, Screen Negative     Barbiturates Screen, Urine Negative     Benzodiazepine Screen, Urine Negative     Cocaine Screen, Urine Positive     Opiate Screen Negative     THC, Screen, Urine Positive     Methadone Screen, Urine Negative     Oxycodone Screen, Urine Negative    Narrative:       Negative Thresholds  For Drugs Screened:     Amphetamines               500 ng/ml   Barbiturates               200 ng/ml   Benzodiazepines            100 ng/ml   Cocaine                    300 ng/ml   Methadone                  300 ng/ml   Opiates                    300 ng/ml   Oxycodone                  100 ng/ml   THC                        50 ng/ml    The Normal Value for all drugs tested is negative. This report includes final unconfirmed screening results to be used for medical treatment purposes only. Unconfirmed results must not be used for non-medical purposes such as employment or legal testing. Clinical consideration should be applied to any drug of abuse test, particulary when unconfirmed results are used.    Blood Culture - Blood, Arm, Left [[id number]] Collected:  08/05/19 0306    Specimen:  Blood from Arm, Left Updated:  08/05/19 0318    Lactic Acid, Plasma [[id number]]  (Normal) Collected:  08/05/19 0306    Specimen:  Blood Updated:  08/05/19 0335     Lactate 2.0 mmol/L     Blood Culture - Blood, Arm, Left [[id number]] Collected:  08/05/19 0337    Specimen:  Blood from Arm, Left Updated:  08/05/19 0342    CBC (No Diff) [[id number]]  (Abnormal) Collected:  08/05/19 0713    Specimen:  Blood from Arm, Right Updated:  08/05/19 0823     WBC 12.09 10*3/mm3      RBC 4.59 10*6/mm3      Hemoglobin 10.8 g/dL      Hematocrit 34.6 %      MCV 75.4 fL      MCH 23.5 pg      MCHC 31.2 g/dL      RDW 15.4 %      RDW-SD 42.1 fl      MPV 12.0 fL      Platelets 164 10*3/mm3     Basic Metabolic Panel [[id number]]  (Abnormal) Collected:  08/05/19 0713    Specimen:  Blood from Arm, Right Updated:  08/05/19 0844     Glucose 86 mg/dL      BUN 12 mg/dL      Creatinine 0.87 mg/dL      Sodium 137 mmol/L      Potassium 4.4 mmol/L      Chloride 103 mmol/L      CO2 22.9 mmol/L      Calcium 8.3 mg/dL      eGFR  African Amer 121 mL/min/1.73      BUN/Creatinine Ratio 13.8     Anion Gap 11.1 mmol/L     Narrative:       GFR Normal >60  Chronic Kidney Disease  "<60  Kidney Failure <15    Procalcitonin [[id number]]  (Abnormal) Collected:  08/05/19 0713    Specimen:  Blood from Arm, Right Updated:  08/05/19 0952     Procalcitonin 1.39 ng/mL     Narrative:       As a Marker for Sepsis (Non-Neonates):   1. <0.5 ng/mL represents a low risk of severe sepsis and/or septic shock.  1. >2 ng/mL represents a high risk of severe sepsis and/or septic shock.    As a Marker for Lower Respiratory Tract Infections that require antibiotic therapy:  PCT on Admission     Antibiotic Therapy             6-12 Hrs later  > 0.5                Strongly Recommended            >0.25 - <0.5         Recommended  0.1 - 0.25           Discouraged                   Remeasure/reassess PCT  <0.1                 Strongly Discouraged          Remeasure/reassess PCT      As 28 day mortality risk marker: \"Change in Procalcitonin Result\" (> 80 % or <=80 %) if Day 0 (or Day 1) and Day 4 values are available. Refer to [email or web address]   Change in PCT <=80 %   A decrease of PCT levels below or equal to 80 % defines a positive change in PCT test result representing a higher risk for 28-day all-cause mortality of patients diagnosed with severe sepsis or septic shock.  Change in PCT > 80 %   A decrease of PCT levels of more than 80 % defines a negative change in PCT result representing a lower risk for 28-day all-cause mortality of patients diagnosed with severe sepsis or septic shock.                Iron Profile [[id number]]  (Abnormal) Collected:  08/05/19 0713    Specimen:  Blood from Arm, Right Updated:  08/05/19 1040     Iron 65 mcg/dL      Iron Saturation 26 %      Transferrin 165 mg/dL      TIBC 246 mcg/dL           Imaging Results (last 24 hours)     Procedure Component Value Units Date/Time    CT Angiogram Chest With Contrast [[id number]] Collected:  08/05/19 0326     Updated:  08/05/19 0354    Narrative:       CT ANGIOGRAM OF THE CHEST     HISTORY: Overdose. Abnormal chest radiograph.   "   COMPARISON: None available.     TECHNIQUE: Axial CT imaging was obtained from the thoracic inlet through  the hemidiaphragm. IV contrast was administered. 3-D reformatted images  were obtained.     FINDINGS:  There is a bilobed nodule seen within the right upper lobe. The more  anterior aspect measures up 1.4 cm. The more posterior component  measures up to 1.3 cm. This does appear to have some vascular supply to,  but it appears to be predominantly pulmonary venous. I am unable to  definitively say whether or not this is a pulmonary arteriovenous  malformation. The patient has 2 additional nodules seen within the left  upper lobe. The more superiorly located nodule measures about 9 mm,  while the more inferior nodule measures about 1.3 cm. Both of these are  associated with some cavitation, again raising the possibility of septic  emboli. An additional tiny 3 mm nodule is seen within the left lower  lobe. There are background emphysematous changes, as well as biapical  fibrosis. The thyroid gland, trachea, and esophagus appear unremarkable.  No acute pulmonary thromboembolus is seen. The thoracic aorta measures  within normal size limits. Mediastinal lymph nodes do not appear  pathologically enlarged. Images through the upper abdomen demonstrate  some distention of the stomach. No other acute abnormalities are seen  within the upper abdomen. No aggressive osseous abnormalities are seen.       Impression:          1. There is a bilobed nodule within the right upper lobe which is  indeterminate. It does have some vascular supply, although I'm unable to  clarify that this is a pulmonary AVM. However, the so-called feeding  vessel sign can also be associated with septic emboli. There are 2  additional nodules with feeding vessels identified. These are located  within the left upper lobe. They are also indeterminate but they do  appear to show some cavitation, which can also be associated with septic  emboli.  Correlation with any history of IV drug abuse is recommended.  Echo could also be considered to evaluate for valvular vegetations.  Patient does have mild background emphysematous changes, and short-term  CT follow-up to document complete resolution is recommended.     Radiation dose reduction techniques were utilized, including automated  exposure control and exposure modulation based on body size.     This report was finalized on 8/5/2019 3:50 AM by Dr. Meggan Blanchard M.D.       XR Chest 2 View [[id number]] Collected:  08/05/19 0250     Updated:  08/05/19 0256    Narrative:       PA AND LATERAL CHEST RADIOGRAPH     HISTORY: Leukocytosis. Hypoxia.     COMPARISON: None available.     FINDINGS:  Heart size is within normal limits. The patient has several scattered  areas of nodularity within both lungs. These are indeterminate on the  basis of this examination, but certainly the possibility of septic  emboli should be considered, as well as one within the left upper lobe  may be cavitary. No aggressive osseous abnormalities are seen. No  pneumothorax or pleural effusion is identified.       Impression:       The patient has a few areas of nodularity seen within both lungs.  Appearance is nonspecific, but septic emboli should be considered.      This report was finalized on 8/5/2019 2:53 AM by Dr. Meggan Blanchard M.D.                  No orders to display        Assessment/Plan     Active Hospital Problems    Diagnosis POA   • **Abnormal CT of the chest [R93.89] Yes   • Pneumonia [J18.9] Yes   • Polysubstance abuse (CMS/HCC) [F19.10] Yes   • Drug overdose [T50.901A] Yes   • Metabolic acidosis [E87.2] Yes   • Tobacco abuse [Z72.0] Yes   • Microcytic anemia [D50.9] Yes       Mr. Madrigal is a 34 y.o. smoker brought by EMS when he was found unresponsive and hypoxic with a toxicology screen positive for THC and cocaine. He denies IV drug use.  Work-up in ED demonstrated WBC of 23 but he remains afebrile and non toxic  appearing.  Chest x-ray-nodules in bilateral lungs, possible septic emboli.  CTA with nodule in RUL with feeding vessel, possible pulmonary AVM vs septic emoboli. 2 nodules in EARL, possible cavitation associated with septic emboli. He has received rocephin and vancomycin.   · Infectious disease consult. Continue current abx until seen by ID   · Check procal. Blood cultures pending.  WBC trending down to 12  · PRN supplemental oxygen but currently VSS   · Consider echocardiogram but will await ID opinion or positive blood cx  · Microcytic anemia- check anemia studies. Decline today from 12.5 to 10.8 is dilutional   · Access consult  · Tobacco abuse cessation counseling     · I discussed the patients findings and my recommendations with patient, family and girlfriend at bedside.    VTE Prophylaxis - SCDs .  Code Status - Full code.       AMRIT Marie  [facility]  08/05/19  9:20 AM    I supervised care provided by the APRN. We have discussed the case. I have reviewed  the note and agree with the plan of treatment. I personally interviewed the patient and examined the patient.  My exam shows a healthy 34-year-old.  No respiratory distress and lungs are fairly clear just occasional rhonchi.  No definitive murmur heard.  Plans as above with current IV antibiotics, follow-up echo and culture.  Infectious disease consultation. If BC and ECHO negative would need close follow up with follow up CT chest probably with Pulmonary.     Young Baez MD  [facility]  08/05/19  2:43 PM

## 2019-08-05 NOTE — ED PROVIDER NOTES
EMERGENCY DEPARTMENT ENCOUNTER    CHIEF COMPLAINT  Chief Complaint: Drug Overdose  History given by: Patient and EMS  History limited by: nothing  Room Number: 13/13  PMD: Provider, No Known      HPI:  Pt is a 34 y.o. male who presents with reported drug overdose PTA. Pt admits to chills. Pt states that he smoke marijuana and then remembers waking up in the ambulance. Pt denies taking any other illicit drugs. Per EMS the pt was given Narcan which awoke the pt. EMS notes that upon arrival the pt's O2 sat was 54% with snoring respirations and the pt was ventilated until states returned to nml.     Duration:  PTA  Onset: gradual  Timing: constant  Quality: Drug Overdose  Intensity/Severity: mild  Progression: improved per EMS  Associated Symptoms: chills  Aggravating Factors: none  Alleviating Factors: none  Previous Episodes: none  Treatment before arrival: Per EMS the pt was given Narcan which awoke the pt. EMS notes that upon arrival the pt's O2 sat was 54% with snoring respirations and the pt was ventilated until states returned to nml.     PAST MEDICAL HISTORY  Active Ambulatory Problems     Diagnosis Date Noted   • No Active Ambulatory Problems     Resolved Ambulatory Problems     Diagnosis Date Noted   • No Resolved Ambulatory Problems     Past Medical History:   Diagnosis Date   • Pneumothorax, spontaneous, tension        PAST SURGICAL HISTORY  Past Surgical History:   Procedure Laterality Date   • CHEST TUBE INSERTION Right        FAMILY HISTORY  History reviewed. No pertinent family history.    SOCIAL HISTORY  Social History     Socioeconomic History   • Marital status: Single     Spouse name: Not on file   • Number of children: Not on file   • Years of education: Not on file   • Highest education level: Not on file   Tobacco Use   • Smoking status: Current Every Day Smoker     Types: Cigars   • Tobacco comment: 1 black mild a day.   Substance and Sexual Activity   • Alcohol use: No     Frequency: Never        ALLERGIES  Patient has no known allergies.    REVIEW OF SYSTEMS  Review of Systems   Constitutional: Positive for chills. Negative for activity change, appetite change and fever.        Positive: Drug Overdose   HENT: Negative for congestion and sore throat.    Eyes: Negative.    Respiratory: Negative for cough and shortness of breath.    Cardiovascular: Negative for chest pain and leg swelling.   Gastrointestinal: Negative for abdominal pain, diarrhea and vomiting.   Endocrine: Negative.    Genitourinary: Negative for decreased urine volume and dysuria.   Musculoskeletal: Negative for neck pain.   Skin: Negative for rash and wound.   Allergic/Immunologic: Negative.    Neurological: Negative for weakness, numbness and headaches.   Hematological: Negative.    Psychiatric/Behavioral: Negative.    All other systems reviewed and are negative.      PHYSICAL EXAM  ED Triage Vitals [08/05/19 0126]   Temp Heart Rate Resp BP SpO2   94.6 °F (34.8 °C) 101 18 125/69 94 %      Temp src Heart Rate Source Patient Position BP Location FiO2 (%)   Tympanic Monitor -- -- --       Physical Exam   Constitutional: He is oriented to person, place, and time. No distress.   Pt is generally shaking and c/o being cold   HENT:   Head: Normocephalic and atraumatic.   Eyes: EOM are normal. Pupils are equal, round, and reactive to light.   Neck: Normal range of motion. Neck supple.   Cardiovascular: Normal rate, regular rhythm and normal heart sounds.   No murmur heard.  Pulmonary/Chest: Effort normal and breath sounds normal. No respiratory distress.   Abdominal: Soft. There is no tenderness. There is no rebound and no guarding.   Musculoskeletal: Normal range of motion. He exhibits no edema.   Neurological: He is alert and oriented to person, place, and time. He has normal sensation and normal strength.   No focal neuro deficits   Skin: Skin is warm and dry.   Psychiatric: Mood and affect normal.   Nursing note and vitals  reviewed.      LAB RESULTS  Lab Results (last 24 hours)     Procedure Component Value Units Date/Time    CBC & Differential [[id number]] Collected:  08/05/19 0141    Specimen:  Blood Updated:  08/05/19 0153    Narrative:       The following orders were created for panel order CBC & Differential.  Procedure                               Abnormality         Status                     ---------                               -----------         ------                     CBC Auto Differential[[id number]]        Abnormal            Final result                 Please view results for these tests on the individual orders.    Comprehensive Metabolic Panel [[id number]]  (Abnormal) Collected:  08/05/19 0141    Specimen:  Blood Updated:  08/05/19 0229     Glucose 130 mg/dL      BUN 14 mg/dL      Creatinine 1.12 mg/dL      Sodium 144 mmol/L      Potassium 4.5 mmol/L      Chloride 107 mmol/L      CO2 17.7 mmol/L      Calcium 9.0 mg/dL      Total Protein 6.9 g/dL      Albumin 4.60 g/dL      ALT (SGPT) 24 U/L      AST (SGOT) 32 U/L      Alkaline Phosphatase 64 U/L      Total Bilirubin 0.2 mg/dL      eGFR  African Amer 90 mL/min/1.73      Globulin 2.3 gm/dL      A/G Ratio 2.0 g/dL      BUN/Creatinine Ratio 12.5     Anion Gap 19.3 mmol/L     Narrative:       GFR Normal >60  Chronic Kidney Disease <60  Kidney Failure <15    Ethanol [[id number]] Collected:  08/05/19 0141    Specimen:  Blood Updated:  08/05/19 0229     Ethanol <10 mg/dL      Ethanol % <0.010 %     CBC Auto Differential [[id number]]  (Abnormal) Collected:  08/05/19 0141    Specimen:  Blood Updated:  08/05/19 0153     WBC 23.79 10*3/mm3      RBC 5.34 10*6/mm3      Hemoglobin 12.5 g/dL      Hematocrit 41.5 %      MCV 77.7 fL      MCH 23.4 pg      MCHC 30.1 g/dL      RDW 15.8 %      RDW-SD 44.0 fl      MPV 9.8 fL      Platelets 181 10*3/mm3      Neutrophil % 83.2 %      Lymphocyte % 9.1 %      Monocyte % 6.0 %      Eosinophil % 0.9 %      Basophil % 0.2 %      Immature Grans %  0.6 %      Neutrophils, Absolute 19.81 10*3/mm3      Lymphocytes, Absolute 2.16 10*3/mm3      Monocytes, Absolute 1.42 10*3/mm3      Eosinophils, Absolute 0.21 10*3/mm3      Basophils, Absolute 0.05 10*3/mm3      Immature Grans, Absolute 0.14 10*3/mm3      nRBC 0.0 /100 WBC     Urinalysis With Microscopic If Indicated (No Culture) - Urine, Clean Catch [[id number]]  (Abnormal) Collected:  08/05/19 0254    Specimen:  Urine, Clean Catch Updated:  08/05/19 0303     Color, UA Yellow     Appearance, UA Clear     pH, UA <=5.0     Specific Gravity, UA >=1.030     Glucose, UA Negative     Ketones, UA Trace     Bilirubin, UA Negative     Blood, UA Negative     Protein, UA Trace     Leuk Esterase, UA Negative     Nitrite, UA Negative     Urobilinogen, UA 1.0 E.U./dL    Narrative:       Urine microscopic not indicated.    Urine Drug Screen - Urine, Clean Catch [[id number]]  (Abnormal) Collected:  08/05/19 0254    Specimen:  Urine, Clean Catch Updated:  08/05/19 0325     Amphet/Methamphet, Screen Negative     Barbiturates Screen, Urine Negative     Benzodiazepine Screen, Urine Negative     Cocaine Screen, Urine Positive     Opiate Screen Negative     THC, Screen, Urine Positive     Methadone Screen, Urine Negative     Oxycodone Screen, Urine Negative    Narrative:       Negative Thresholds For Drugs Screened:     Amphetamines               500 ng/ml   Barbiturates               200 ng/ml   Benzodiazepines            100 ng/ml   Cocaine                    300 ng/ml   Methadone                  300 ng/ml   Opiates                    300 ng/ml   Oxycodone                  100 ng/ml   THC                        50 ng/ml    The Normal Value for all drugs tested is negative. This report includes final unconfirmed screening results to be used for medical treatment purposes only. Unconfirmed results must not be used for non-medical purposes such as employment or legal testing. Clinical consideration should be applied to any drug of abuse  test, particulary when unconfirmed results are used.    Blood Culture - Blood, Arm, Left [[id number]] Collected:  08/05/19 0306    Specimen:  Blood from Arm, Left Updated:  08/05/19 0318    Lactic Acid, Plasma [[id number]]  (Normal) Collected:  08/05/19 0306    Specimen:  Blood Updated:  08/05/19 0335     Lactate 2.0 mmol/L     Blood Culture - Blood, Arm, Left [[id number]] Collected:  08/05/19 0337    Specimen:  Blood from Arm, Left Updated:  08/05/19 0342          I ordered the above labs and reviewed the results    RADIOLOGY  CT Angiogram Chest With Contrast   Final Result       1. There is a bilobed nodule within the right upper lobe which is   indeterminate. It does have some vascular supply, although I'm unable to   clarify that this is a pulmonary AVM. However, the so-called feeding   vessel sign can also be associated with septic emboli. There are 2   additional nodules with feeding vessels identified. These are located   within the left upper lobe. They are also indeterminate but they do   appear to show some cavitation, which can also be associated with septic   emboli. Correlation with any history of IV drug abuse is recommended.   Echo could also be considered to evaluate for valvular vegetations.   Patient does have mild background emphysematous changes, and short-term   CT follow-up to document complete resolution is recommended.       Radiation dose reduction techniques were utilized, including automated   exposure control and exposure modulation based on body size.       This report was finalized on 8/5/2019 3:50 AM by Dr. Meggan Blanchard M.D.          XR Chest 2 View   Final Result   The patient has a few areas of nodularity seen within both lungs.   Appearance is nonspecific, but septic emboli should be considered.        This report was finalized on 8/5/2019 2:53 AM by Dr. Meggan Blanchard M.D.               I ordered the above noted radiological studies. Interpreted by radiologist. Discussed  with radiologist (Dr. Blanchard). Reviewed by me in PACS.       PROCEDURES  Procedures      PROGRESS AND CONSULTS     0131- Initial encounter. The patient is resting comfortably and in NAD. Discussed the plan for observation since Narcan was administered . Pt understands and agrees with the plan, all questions answered.    0134- CXR and labs ordered for further evaluation due to the pt c/o of a drug overdose.     0251- Dr. Blanchard (radiolgy) called and suggested that another scan be ordered for further evaluation of possible septic emboli.     0257- CTA chest ordered to rule out septic emboli    0258- Rechecked pt who is resting comfortably in NAD. Informed pt of the lab and imaging results. D/w pt the plan to have a CTA of the chest for possible septic emboli. D/w pt the possible need for admission pending the CTA results Pt understands and agrees with plan. All questions answered.      0414- Rechecked pt who is resting comfortably in NAD. Pt denies IV drug use. Informed pt of the imaging results. D/w pt the plan to admit for further workup and care. Pt understands and agrees with plan. All questions answered.      0419- Call placed to [facility].    0442- Discussed pt's case with AMRIT Anguiano ([facility]) who agrees with plan to admit the pt for further workup to Dr. Nayak ([facility])                MEDICATIONS GIVEN IN ER  Medications   sodium chloride 0.9 % flush 10 mL (not administered)   sodium chloride 0.9 % infusion (0 mL/hr Intravenous Stopped 8/5/19 0350)   cefTRIAXone (ROCEPHIN) IVPB 2 g (2 g Intravenous New Bag 8/5/19 1036)   vancomycin 1750 mg/500 mL 0.9% NS IVPB ([facility]) (not administered)   iopamidol (ISOVUE-370) 76 % injection 100 mL (95 mL Intravenous Given by Other 8/5/19 7663)           MEDICAL DECISION MAKING  Results were reviewed/discussed with the patient and they were also made aware of online access. Pt also made aware that some labs, such as cultures, will not be resulted during ER visit and follow  up with PMD is necessary.     MDM  Number of Diagnoses or Management Options  Acute septic pulmonary embolism without acute cor pulmonale (CMS/HCC):      Amount and/or Complexity of Data Reviewed  Clinical lab tests: ordered and reviewed (WBC: 23.79  Drug Screen: Positive for THC and Cocaine)  Tests in the radiology section of CPT®: ordered and reviewed (See radiology report)  Discuss the patient with other providers: yes (AMRIT Anguiano ([facility]))    Patient Progress  Patient progress: stable         DIAGNOSIS  Final diagnoses:   Acute septic pulmonary embolism without acute cor pulmonale (CMS/HCC)       DISPOSITION  ADMISSION    Discussed treatment plan and reason for admission with pt/family and admitting physician.  Pt/family voiced understanding of the plan for admission for further testing/treatment as needed.         Latest Documented Vital Signs:  As of 4:47 AM  BP- 101/68 HR- 64 Temp- 97.7 °F (36.5 °C) (Oral) O2 sat- 100%    --  Documentation assistance provided by igulia Burden for Dr. Cook.  Information recorded by the scribe was done at my direction and has been verified and validated by me.       Areli Burden  08/05/19 3356       Eduardo Cook MD  08/05/19 5588

## 2019-08-06 VITALS
RESPIRATION RATE: 18 BRPM | HEART RATE: 53 BPM | HEIGHT: 77 IN | WEIGHT: 168 LBS | DIASTOLIC BLOOD PRESSURE: 72 MMHG | TEMPERATURE: 97.4 F | BODY MASS INDEX: 19.84 KG/M2 | OXYGEN SATURATION: 100 % | SYSTOLIC BLOOD PRESSURE: 123 MMHG

## 2019-08-06 LAB
ANION GAP SERPL CALCULATED.3IONS-SCNC: 9.1 MMOL/L (ref 5–15)
BUN BLD-MCNC: 8 MG/DL (ref 6–20)
BUN/CREAT SERPL: 10 (ref 7–25)
CALCIUM SPEC-SCNC: 8.6 MG/DL (ref 8.6–10.5)
CHLORIDE SERPL-SCNC: 108 MMOL/L (ref 98–107)
CO2 SERPL-SCNC: 24.9 MMOL/L (ref 22–29)
CREAT BLD-MCNC: 0.8 MG/DL (ref 0.76–1.27)
DEPRECATED RDW RBC AUTO: 43.1 FL (ref 37–54)
ERYTHROCYTE [DISTWIDTH] IN BLOOD BY AUTOMATED COUNT: 15.7 % (ref 12.3–15.4)
FERRITIN SERPL-MCNC: 164 NG/ML (ref 30–400)
FOLATE SERPL-MCNC: 4.95 NG/ML (ref 4.78–24.2)
GFR SERPL CREATININE-BSD FRML MDRD: 133 ML/MIN/1.73
GLUCOSE BLD-MCNC: 91 MG/DL (ref 65–99)
HAV IGM SERPL QL IA: NORMAL
HBV CORE IGM SERPL QL IA: NORMAL
HBV SURFACE AG SERPL QL IA: NORMAL
HCT VFR BLD AUTO: 36.8 % (ref 37.5–51)
HCV AB SER DONR QL: NORMAL
HGB BLD-MCNC: 11.1 G/DL (ref 13–17.7)
HIV1+2 AB SER QL: NORMAL
MCH RBC QN AUTO: 23 PG (ref 26.6–33)
MCHC RBC AUTO-ENTMCNC: 30.2 G/DL (ref 31.5–35.7)
MCV RBC AUTO: 76.2 FL (ref 79–97)
PLATELET # BLD AUTO: 164 10*3/MM3 (ref 140–450)
PMV BLD AUTO: 11.4 FL (ref 6–12)
POTASSIUM BLD-SCNC: 4 MMOL/L (ref 3.5–5.2)
RBC # BLD AUTO: 4.83 10*6/MM3 (ref 4.14–5.8)
SODIUM BLD-SCNC: 142 MMOL/L (ref 136–145)
VANCOMYCIN TROUGH SERPL-MCNC: 20.2 MCG/ML (ref 5–20)
VIT B12 BLD-MCNC: 290 PG/ML (ref 211–946)
WBC NRBC COR # BLD: 4.81 10*3/MM3 (ref 3.4–10.8)

## 2019-08-06 PROCEDURE — 96367 TX/PROPH/DG ADDL SEQ IV INF: CPT

## 2019-08-06 PROCEDURE — G0432 EIA HIV-1/HIV-2 SCREEN: HCPCS | Performed by: INTERNAL MEDICINE

## 2019-08-06 PROCEDURE — 25010000002 VANCOMYCIN PER 500 MG: Performed by: NURSE PRACTITIONER

## 2019-08-06 PROCEDURE — 36415 COLL VENOUS BLD VENIPUNCTURE: CPT | Performed by: NURSE PRACTITIONER

## 2019-08-06 PROCEDURE — 25010000003 CEFTRIAXONE PER 250 MG: Performed by: NURSE PRACTITIONER

## 2019-08-06 PROCEDURE — 82728 ASSAY OF FERRITIN: CPT | Performed by: NURSE PRACTITIONER

## 2019-08-06 PROCEDURE — 80202 ASSAY OF VANCOMYCIN: CPT | Performed by: NURSE PRACTITIONER

## 2019-08-06 PROCEDURE — G0378 HOSPITAL OBSERVATION PER HR: HCPCS

## 2019-08-06 PROCEDURE — 80074 ACUTE HEPATITIS PANEL: CPT | Performed by: INTERNAL MEDICINE

## 2019-08-06 PROCEDURE — 82607 VITAMIN B-12: CPT | Performed by: NURSE PRACTITIONER

## 2019-08-06 PROCEDURE — 96366 THER/PROPH/DIAG IV INF ADDON: CPT

## 2019-08-06 PROCEDURE — 82746 ASSAY OF FOLIC ACID SERUM: CPT | Performed by: NURSE PRACTITIONER

## 2019-08-06 PROCEDURE — 80048 BASIC METABOLIC PNL TOTAL CA: CPT | Performed by: INTERNAL MEDICINE

## 2019-08-06 PROCEDURE — 85027 COMPLETE CBC AUTOMATED: CPT | Performed by: INTERNAL MEDICINE

## 2019-08-06 RX ADMIN — CEFTRIAXONE SODIUM 2 G: 2 INJECTION, SOLUTION INTRAVENOUS at 04:10

## 2019-08-06 RX ADMIN — VANCOMYCIN HYDROCHLORIDE 1000 MG: 1 INJECTION, SOLUTION INTRAVENOUS at 05:45

## 2019-08-06 RX ADMIN — VANCOMYCIN HYDROCHLORIDE 1000 MG: 1 INJECTION, SOLUTION INTRAVENOUS at 13:20

## 2019-08-06 NOTE — PROGRESS NOTES
Discharge Planning Assessment  [facility]     Patient Name: Ozzy Madrigal  MRN: [id number]  Today's Date: 8/6/2019    Admit Date: 8/5/2019    Discharge Needs Assessment     Row Name 08/06/19 1457       Living Environment    Lives With  significant other    Current Living Arrangements  home/apartment/condo    Primary Care Provided by  self    Able to Return to Prior Arrangements  yes       Transition Planning    Patient/Family Anticipates Transition to  home with family    Patient/Family Anticipated Services at Transition  none    Transportation Anticipated  family or friend will provide       Discharge Needs Assessment    Concerns to be Addressed  no discharge needs identified;denies needs/concerns at this time    Equipment Currently Used at Home  none    Anticipated Changes Related to Illness  none    Equipment Needed After Discharge  none        Discharge Plan     Row Name 08/06/19 1457       Plan    Plan  Plans are home.      Patient/Family in Agreement with Plan  yes    Plan Comments  CCP spoke with pt @ bedside, confirmed face sheet with pt.  Pt does not take meds, but states if he needs meds, he will use [facility] on [address] @ [place].  Plans are home. CCP will follow for dc needs. Nadira Miller RN        Destination      No service coordination in this encounter.      Durable Medical Equipment      No service coordination in this encounter.      Dialysis/Infusion      No service coordination in this encounter.      Home Medical Care      No service coordination in this encounter.      Therapy      No service coordination in this encounter.      Community Resources      No service coordination in this encounter.          Demographic Summary    No documentation.       Functional Status    No documentation.       Psychosocial    No documentation.       Abuse/Neglect    No documentation.       Legal    No documentation.       Substance Abuse    No documentation.       Patient Forms    No documentation.            Nadira Miller, RN

## 2019-08-06 NOTE — PROGRESS NOTES
"Pharmacokinetic Consult - Vancomycin Dosing (Follow-up Note)    Ozzy Madrigal is on day #2 pharmacy to dose vancomycin for sepsis/possible septic emboli.    Pharmacy dosing vancomycin per Tawanna Hampton request.   Goal trough: 15-20 mg/L   Other antimicrobials: Ceftriaxone 2gm q24h     Current Dose: Vancomycin 1 gram iv q 8 hours    Relevant clinical data and objective history reviewed:  34 y.o. male 195.6 cm (77\") 76.2 kg (168 lb)    Past Medical History:   Diagnosis Date   • Pneumothorax, spontaneous, tension     right - pt was seen at [facility] for this in either 2014 or 2015.     Creatinine   Date Value Ref Range Status   08/06/2019 0.80 0.76 - 1.27 mg/dL Final   08/05/2019 0.87 0.76 - 1.27 mg/dL Final   08/05/2019 1.12 0.76 - 1.27 mg/dL Final     BUN   Date Value Ref Range Status   08/06/2019 8 6 - 20 mg/dL Final     Estimated Creatinine Clearance: 138.9 mL/min (by C-G formula based on SCr of 0.8 mg/dL).    Lab Results   Component Value Date    WBC 4.81 08/06/2019     Temp Readings from Last 3 Encounters:   08/06/19 97.4 °F (36.3 °C) (Oral)     Baseline culture/source/susceptibility: 8/5 B/C x 2  NG x prelim      Anti-Infectives (From admission, onward)    Ordered     Dose/Rate Route Frequency Start Stop    08/05/19 0510  cefTRIAXone (ROCEPHIN) IVPB 2 g     Ordering Provider:  Tawanna Hampton APRN    2 g  100 mL/hr over 30 Minutes Intravenous Every 24 Hours 08/06/19 0430 08/11/19 0429    08/05/19 0510  vancomycin (VANCOCIN) in iso-osmotic dextrose IVPB 1 g (premix) 200 mL     Ordering Provider:  Tawanna Hampton APRN    1,000 mg Intravenous Every 8 Hours 08/05/19 1230 08/10/19 1229    08/05/19 0459  Pharmacy to dose vancomycin     Ordering Provider:  Tawanna Hampton APRN     Does not apply Continuous PRN 08/05/19 0458 08/10/19 0457    08/05/19 0419  cefTRIAXone (ROCEPHIN) IVPB 2 g     Ordering Provider:  Eduardo Cook MD    2 g  100 mL/hr over 30 Minutes Intravenous Once 08/05/19 0421 08/05/19 " 0457    08/05/19 0419  vancomycin 1750 mg/500 mL 0.9% NS IVPB ([facility])     Ordering Provider:  Eduardo Cook MD    20 mg/kg × 83.9 kg Intravenous Once 08/05/19 0421 08/05/19 0518         Lab Results   Component Value Date    BRENNA 20.20 (H) 08/06/2019      VANCOMYCIN DOSING SCHEDULE ( INCLUDING LEVELS)    Vancomycin 1750mg IV load                8/05 0518   Vancomycin 1000mg IV q 8 h              8/05 1247,2038 8/06 0545, 1320  Vancomycin trough = 20.2 mcg/ml ( 6 hour, drawn 2 hr early)             8/06 1157    Assessment/Plan  1.  Therapeutic trough  ( level closer to 15-16 mcg/ml)  Cont same  2. Monitor renal function…stable  3. Encourage hydration to prevent toxic accumulation   4. Monitor for s/sx of toxicity including incr in SCr and decr in UOP  5. Pharmacy will continue to follow and adjust accordingly    Violet Lunsford [facility]

## 2019-08-06 NOTE — PLAN OF CARE
Problem: Patient Care Overview  Goal: Plan of Care Review  Outcome: Ongoing (interventions implemented as appropriate)   08/05/19 1800   Coping/Psychosocial   Plan of Care Reviewed With patient   Plan of Care Review   Progress improving   OTHER   Outcome Summary A&O x4, No distress noted, MD Aware of WBC and Procal (on Vanc and ID consulted). Calm and cooperative, Safety maintained. Will continue to monitor. CCRN 8/5/19 @ 1800

## 2019-08-06 NOTE — DISCHARGE INSTRUCTIONS
Follow up with pulmonology (lung doctor). You need a repeat CT chest in 4-6 weeks.  If you have any further chest pain or shortness of breath please go to the ER immediately.  Stop abusing illicit substances.

## 2019-08-06 NOTE — DISCHARGE SUMMARY
[facility]    Date of Discharge:  8/6/2019    PCP: Provider, No Known    Discharge Diagnosis:   Active Hospital Problems    Diagnosis  POA   • **Abnormal CT of the chest [R93.89]  Yes   • Pneumonia [J18.9]  Yes   • Polysubstance abuse (CMS/HCC) [F19.10]  Yes   • Drug overdose [T50.901A]  Yes   • Metabolic acidosis [E87.2]  Yes   • Tobacco abuse [Z72.0]  Yes   • Microcytic anemia [D50.9]  Yes      Resolved Hospital Problems   No resolved problems to display.     Procedures Performed       Consults     Date and Time Order Name Status Description    8/5/2019 1145 Inpatient Infectious Diseases Consult Completed     8/5/2019 1265 [facility] (on-call MD unless specified) Details Completed         Hospital Course  Please see history and physical for details. Patient is a 34 y.o. male with a history of polysubstance abuse initially brought to the ED by EMS for hypoxia, AMS secondary to opioid overdose. Pt was found to have a WBC of 23 with nodules in bilateral lungs on chest x-ray suggestive of possible septic emboli.  CTA demonstrated upper lobe nodular lesions concerning for septic emboli vs  pulmonary AVM .  He has remained afebrile and blood cultures are negative to date.  Procalcitonin was mildly elevated concerning for bacteremia.  Infectious disease was consulted.  He has been treated empirically with vancomycin and cefepime.  Echocardiogram completed yesterday was unremarkable.  Infectious disease has checked HIV and acute hepatitis panel which are both negative but given his polysubstance abuse.  His WBC has now normalized. Patient's vital signs are stable and he denies chest pain or shortness of breath.  I have discussed the patient's case with Giulia Gibson MD she is agreeable to discharging patient today without antibiotics. No indication for follow up with ID.  He does need follow-up with pulmonary as an outpatient to have a repeat CT of the chest in 4 to 6 weeks.   Patient has no complaints at this time and all questions answered to his stated satisfaction. I have emphasized the importance of cessation of all illicit substances.     I discussed the patient's findings and my recommendations with patient, family and consulting provider.    Condition on Discharge: Improved.     Temp:  [97.4 °F (36.3 °C)-98 °F (36.7 °C)] 97.4 °F (36.3 °C)  Heart Rate:  [52-56] 53  Resp:  [16-18] 18  BP: (111-123)/(67-73) 123/72  Body mass index is 19.92 kg/m².    Physical Exam   Constitutional: He is oriented to person, place, and time. He appears well-developed and well-nourished. No distress.   HENT:   Head: Normocephalic and atraumatic.   Mouth/Throat: Oropharynx is clear and moist.   Cardiovascular: Normal rate, regular rhythm, normal heart sounds and intact distal pulses.   No murmur heard.  Pulmonary/Chest: Effort normal and breath sounds normal. No respiratory distress.   Abdominal: Soft. Bowel sounds are normal. He exhibits no distension and no mass. There is no tenderness. No hernia.   Neurological: He is alert and oriented to person, place, and time. No cranial nerve deficit.   Skin: Skin is warm and dry.   Psychiatric: He has a normal mood and affect. His behavior is normal. Judgment and thought content normal.   Vitals reviewed.       Discharge Medications      Patient Not Prescribed Medications Upon Discharge        Diet Instructions     Diet: Regular, Cardiac      Discharge Diet:   Regular  Cardiac            Activity Instructions     Activity as Tolerated           Additional Instructions for the Follow-ups that You Need to Schedule     Discharge Follow-up with Specialty: 1 Month   As directed      Follow Up:  1 Month    Follow Up Details:  pulmonary           Follow-up Information     Provider, No Known Follow up.    Contact information:  [facility] [id number] [phone]             Gagan Burciaga MD Follow up.    Specialty:  Pulmonary Disease  Why:  call  for appt in 4weeks to repeat CT chest   Contact information:  [address]  [place] [id number] [phone]                 Test Results Pending at Discharge   Order Current Status    Blood Culture - Blood, Arm, Left Preliminary result    Blood Culture - Blood, Arm, Left Preliminary result         Jaida Fuentes, AMRIT  08/06/19  4:13 PM    Discharge time spent greater than 35 minutes.

## 2019-08-10 LAB
BACTERIA SPEC AEROBE CULT: NORMAL
BACTERIA SPEC AEROBE CULT: NORMAL